# Patient Record
Sex: MALE | Race: WHITE | NOT HISPANIC OR LATINO
[De-identification: names, ages, dates, MRNs, and addresses within clinical notes are randomized per-mention and may not be internally consistent; named-entity substitution may affect disease eponyms.]

---

## 2019-06-26 PROBLEM — Z00.00 ENCOUNTER FOR PREVENTIVE HEALTH EXAMINATION: Status: ACTIVE | Noted: 2019-06-26

## 2019-07-05 ENCOUNTER — APPOINTMENT (OUTPATIENT)
Dept: CARDIOLOGY | Facility: CLINIC | Age: 59
End: 2019-07-05

## 2019-07-26 ENCOUNTER — APPOINTMENT (OUTPATIENT)
Dept: CARDIOLOGY | Facility: CLINIC | Age: 59
End: 2019-07-26
Payer: COMMERCIAL

## 2019-07-26 ENCOUNTER — NON-APPOINTMENT (OUTPATIENT)
Age: 59
End: 2019-07-26

## 2019-07-26 VITALS
HEART RATE: 90 BPM | OXYGEN SATURATION: 97 % | BODY MASS INDEX: 25.06 KG/M2 | DIASTOLIC BLOOD PRESSURE: 72 MMHG | SYSTOLIC BLOOD PRESSURE: 122 MMHG | HEIGHT: 72 IN | WEIGHT: 185 LBS

## 2019-07-26 DIAGNOSIS — Z86.39 PERSONAL HISTORY OF OTHER ENDOCRINE, NUTRITIONAL AND METABOLIC DISEASE: ICD-10-CM

## 2019-07-26 DIAGNOSIS — Z78.9 OTHER SPECIFIED HEALTH STATUS: ICD-10-CM

## 2019-07-26 DIAGNOSIS — Z82.49 FAMILY HISTORY OF ISCHEMIC HEART DISEASE AND OTHER DISEASES OF THE CIRCULATORY SYSTEM: ICD-10-CM

## 2019-07-26 DIAGNOSIS — Z86.79 PERSONAL HISTORY OF OTHER DISEASES OF THE CIRCULATORY SYSTEM: ICD-10-CM

## 2019-07-26 PROCEDURE — 93000 ELECTROCARDIOGRAM COMPLETE: CPT

## 2019-07-26 PROCEDURE — 99204 OFFICE O/P NEW MOD 45 MIN: CPT | Mod: 25

## 2019-07-26 RX ORDER — ASPIRIN 81 MG
81 TABLET, DELAYED RELEASE (ENTERIC COATED) ORAL
Refills: 0 | Status: ACTIVE | COMMUNITY

## 2019-07-26 RX ORDER — ATORVASTATIN CALCIUM 80 MG/1
80 TABLET, FILM COATED ORAL DAILY
Refills: 0 | Status: ACTIVE | COMMUNITY

## 2019-07-26 RX ORDER — LOSARTAN POTASSIUM 25 MG/1
25 TABLET, FILM COATED ORAL DAILY
Refills: 0 | Status: ACTIVE | COMMUNITY

## 2019-07-26 RX ORDER — CLOPIDOGREL BISULFATE 75 MG/1
75 TABLET, FILM COATED ORAL DAILY
Refills: 0 | Status: ACTIVE | COMMUNITY

## 2019-07-26 NOTE — HISTORY OF PRESENT ILLNESS
[FreeTextEntry1] : This is a 59 year old male with history of HTN, HLD (admittedly non-compliant with medications prior his CVA) presents after suffering a CVA. Patient presented to [facility] 6/30/2019, with symptoms of LUE and LLE numbness and tingling. He went to the beach that day and noticed the symptoms then. He also reported temperature sensation decrease. He has had no chest pain, SOB, or palpitations. He underwent MRI of the brain which demonstrated  changes in the right medulla consistent with right medulla stroke. The MRA of the head and and neck revealed loss of flow signal in the right vertebral artery. He was transferred to [facility] for further evaluation and management. Imaging at the hospital confirmed the findings. He was treated medically with high dose statin and dual antiplatelet therapy. Transthoracic echocardiogram was unremarkable. \par \par Patient admits to being very active. He runs on the beach most days of the week. He denies any exertional chest pain, SOB, or palpitations.

## 2019-07-26 NOTE — REVIEW OF SYSTEMS
[Shortness Of Breath] : no shortness of breath [Dyspnea on exertion] : not dyspnea during exertion [Chest Pain] : no chest pain [Lower Ext Edema] : no extremity edema [Palpitations] : no palpitations [see HPI] : see HPI [Limb Weakness (Paresis)] : no limb weakness [Numbness (Hypesthesia)] : numbness [Tingling (Paresthesia)] : tingling [Easy Bleeding] : no tendency for easy bleeding [Easy Bruising] : no tendency for easy bruising [Negative] : Endocrine

## 2019-07-26 NOTE — DISCUSSION/SUMMARY
[FreeTextEntry1] : 1. History of ischemic CVA: recommend neurology follow up. Reviewed and discussed hospital records with patient. Discussed importance of medication compliance going forward. He should remain on DAPT, high dose statin therapy (goal LDL <70), ARB.\par \par 2. Hypertension: appears controlled. Recommend continuing current medical therapy.\par \par 3. HLD: LDL in hospital 98. Started on atorvastatin 80mg daily. Goal LDL <70. Recommend repeat lipid panel in 3-6 months. \par \par Patient can follow up in 6 months or sooner if needed.

## 2019-07-26 NOTE — PHYSICAL EXAM
[General Appearance - Well Developed] : well developed [Well Groomed] : well groomed [Normal Appearance] : normal appearance [General Appearance - Well Nourished] : well nourished [General Appearance - In No Acute Distress] : no acute distress [Eyelids - No Xanthelasma] : the eyelids demonstrated no xanthelasmas [Normal Conjunctiva] : the conjunctiva exhibited no abnormalities [No Oral Cyanosis] : no oral cyanosis [No Oral Pallor] : no oral pallor [FreeTextEntry1] : No JVD, no carotid artery bruits auscultated bilaterally [Auscultation Breath Sounds / Voice Sounds] : lungs were clear to auscultation bilaterally [Respiration, Rhythm And Depth] : normal respiratory rhythm and effort [Exaggerated Use Of Accessory Muscles For Inspiration] : no accessory muscle use [Gait - Sufficient For Exercise Testing] : the gait was sufficient for exercise testing [Abnormal Walk] : normal gait [Nail Clubbing] : no clubbing of the fingernails [Cyanosis, Localized] : no localized cyanosis [Nail Splinter Hemorrhages] : no splinter hemorrhages of the nails [Petechial Hemorrhages (___cm)] : no petechial hemorrhages [Skin Color & Pigmentation] : normal skin color and pigmentation [Impaired Insight] : insight and judgment were intact [] : no rash [Affect] : the affect was normal [Memory Recent] : recent memory was not impaired

## 2019-08-28 ENCOUNTER — APPOINTMENT (OUTPATIENT)
Dept: CARDIOLOGY | Facility: CLINIC | Age: 59
End: 2019-08-28
Payer: COMMERCIAL

## 2019-08-28 VITALS
HEART RATE: 65 BPM | WEIGHT: 187 LBS | HEIGHT: 72 IN | SYSTOLIC BLOOD PRESSURE: 132 MMHG | BODY MASS INDEX: 25.33 KG/M2 | DIASTOLIC BLOOD PRESSURE: 80 MMHG | OXYGEN SATURATION: 98 %

## 2019-08-28 PROCEDURE — 99214 OFFICE O/P EST MOD 30 MIN: CPT

## 2019-08-28 NOTE — PHYSICAL EXAM
[General Appearance - Well Developed] : well developed [Normal Appearance] : normal appearance [Well Groomed] : well groomed [General Appearance - Well Nourished] : well nourished [General Appearance - In No Acute Distress] : no acute distress [Normal Conjunctiva] : the conjunctiva exhibited no abnormalities [Eyelids - No Xanthelasma] : the eyelids demonstrated no xanthelasmas [No Oral Pallor] : no oral pallor [No Oral Cyanosis] : no oral cyanosis [Respiration, Rhythm And Depth] : normal respiratory rhythm and effort [Exaggerated Use Of Accessory Muscles For Inspiration] : no accessory muscle use [Auscultation Breath Sounds / Voice Sounds] : lungs were clear to auscultation bilaterally [Heart Rate And Rhythm] : heart rate and rhythm were normal [Heart Sounds] : normal S1 and S2 [Murmurs] : no murmurs present [Edema] : no peripheral edema present [Abnormal Walk] : normal gait [Gait - Sufficient For Exercise Testing] : the gait was sufficient for exercise testing [Nail Clubbing] : no clubbing of the fingernails [Cyanosis, Localized] : no localized cyanosis [Petechial Hemorrhages (___cm)] : no petechial hemorrhages [Nail Splinter Hemorrhages] : no splinter hemorrhages of the nails [Skin Color & Pigmentation] : normal skin color and pigmentation [] : no rash [Impaired Insight] : insight and judgment were intact [Affect] : the affect was normal [Memory Recent] : recent memory was not impaired [FreeTextEntry1] : No JVD, no carotid artery bruits auscultated bilaterally

## 2019-08-28 NOTE — REVIEW OF SYSTEMS
[see HPI] : see HPI [Numbness (Hypesthesia)] : numbness [Tingling (Paresthesia)] : tingling [Negative] : Endocrine [Shortness Of Breath] : no shortness of breath [Dyspnea on exertion] : not dyspnea during exertion [Chest Pain] : no chest pain [Lower Ext Edema] : no extremity edema [Palpitations] : no palpitations [Limb Weakness (Paresis)] : no limb weakness [Easy Bleeding] : no tendency for easy bleeding [Easy Bruising] : no tendency for easy bruising

## 2019-08-28 NOTE — DISCUSSION/SUMMARY
[FreeTextEntry1] : 1. History of ischemic CVA: patient saw neurology follow up. Reviewed and discussed hospital records with patient. Discussed importance of medication compliance going forward. He should remain on DAPT, high dose statin therapy (goal LDL <70), ARB. Neurology recommended loop recorder. Patient doesn't want any external monitoring device. I did explain the loop recorder procedure, benefits and risks. Discussed that we would be looking for occult PAF, which would change his medical management if present. He states he wants to hold off for now and return in about 6 weeks to discuss further. \par \par 2. Hypertension: appears controlled. Recommend continuing current medical therapy.\par \par 3. HLD: LDL in hospital 98. Started on atorvastatin 80mg daily. Goal LDL <70. Recommend repeat lipid panel in 3-6 months. \par

## 2019-08-28 NOTE — HISTORY OF PRESENT ILLNESS
[FreeTextEntry1] : This is a 59 year old male with history of HTN, HLD (admittedly non-compliant with medications prior his CVA) presents after suffering a CVA. Patient presented to [facility] 6/30/2019, with symptoms of LUE and LLE numbness and tingling. He went to the beach that day and noticed the symptoms then. He also reported temperature sensation decrease. He has had no chest pain, SOB, or palpitations. He underwent MRI of the brain which demonstrated  changes in the right medulla consistent with right medulla stroke. The MRA of the head and and neck revealed loss of flow signal in the right vertebral artery. He was transferred to [facility] for further evaluation and management. Imaging at the hospital confirmed the findings. He was treated medically with high dose statin and dual antiplatelet therapy. Transthoracic echocardiogram was unremarkable. \par \par Patient admits to being very active. He runs on the beach most days of the week. He denies any exertional chest pain, SOB, or palpitations.

## 2019-10-18 ENCOUNTER — APPOINTMENT (OUTPATIENT)
Dept: CARDIOLOGY | Facility: CLINIC | Age: 59
End: 2019-10-18
Payer: COMMERCIAL

## 2019-10-18 VITALS
HEIGHT: 72 IN | HEART RATE: 64 BPM | DIASTOLIC BLOOD PRESSURE: 66 MMHG | BODY MASS INDEX: 26.14 KG/M2 | SYSTOLIC BLOOD PRESSURE: 134 MMHG | WEIGHT: 193 LBS | OXYGEN SATURATION: 95 %

## 2019-10-18 PROCEDURE — 99214 OFFICE O/P EST MOD 30 MIN: CPT

## 2019-10-18 NOTE — PHYSICAL EXAM
[General Appearance - Well Developed] : well developed [Normal Appearance] : normal appearance [Well Groomed] : well groomed [Normal Conjunctiva] : the conjunctiva exhibited no abnormalities [General Appearance - In No Acute Distress] : no acute distress [General Appearance - Well Nourished] : well nourished [Eyelids - No Xanthelasma] : the eyelids demonstrated no xanthelasmas [No Oral Pallor] : no oral pallor [FreeTextEntry1] : No JVD, no carotid artery bruits auscultated bilaterally [No Oral Cyanosis] : no oral cyanosis [Respiration, Rhythm And Depth] : normal respiratory rhythm and effort [Heart Rate And Rhythm] : heart rate and rhythm were normal [Exaggerated Use Of Accessory Muscles For Inspiration] : no accessory muscle use [Auscultation Breath Sounds / Voice Sounds] : lungs were clear to auscultation bilaterally [Murmurs] : no murmurs present [Heart Sounds] : normal S1 and S2 [Edema] : no peripheral edema present [Abnormal Walk] : normal gait [Gait - Sufficient For Exercise Testing] : the gait was sufficient for exercise testing [Nail Clubbing] : no clubbing of the fingernails [Cyanosis, Localized] : no localized cyanosis [Nail Splinter Hemorrhages] : no splinter hemorrhages of the nails [Petechial Hemorrhages (___cm)] : no petechial hemorrhages [Skin Color & Pigmentation] : normal skin color and pigmentation [] : no rash [Impaired Insight] : insight and judgment were intact [Memory Recent] : recent memory was not impaired [Affect] : the affect was normal

## 2019-10-18 NOTE — DISCUSSION/SUMMARY
[FreeTextEntry1] : 1. History of ischemic CVA: patient saw neurology in follow up. Reviewed and discussed hospital records with patient. Discussed importance of medication compliance going forward. He should remain on DAPT, high dose statin therapy (goal LDL <70), ARB. Neurology recommended loop recorder. Patient does not want ILR now. He is willing to do 14 Day Zio Patch. I did explain the loop recorder procedure, benefits and risks. Discussed that we would be looking for occult PAF, which would change his medical management if present. He states he wants to hold off for now.\par \par 2. Hypertension: appears controlled. Recommend continuing current medical therapy.\par \par 3. HLD: LDL in hospital 98. Started on atorvastatin 80mg daily. Goal LDL <70. Recommend repeat lipid panel in 3-6 months. \par \par Follow up in 6 months.\par

## 2019-10-18 NOTE — HISTORY OF PRESENT ILLNESS
[FreeTextEntry1] : This is a 59 year old male with history of HTN, HLD (admittedly non-compliant with medications prior his CVA) presents after suffering a CVA. Patient presented to [facility] 6/30/2019, with symptoms of LUE and LLE numbness and tingling. He went to the beach that day and noticed the symptoms then. He also reported temperature sensation decrease. He has had no chest pain, SOB, or palpitations. He underwent MRI of the brain which demonstrated  changes in the right medulla consistent with right medulla stroke. The MRA of the head and and neck revealed loss of flow signal in the right vertebral artery. He was transferred to [facility] for further evaluation and management. Imaging at the hospital confirmed the findings. He was treated medically with high dose statin and dual antiplatelet therapy. Transthoracic echocardiogram was unremarkable. \par \par Patient admits to being very active. He runs on the beach most days of the week. He denies any exertional chest pain, SOB, or palpitations.

## 2019-11-18 ENCOUNTER — APPOINTMENT (OUTPATIENT)
Dept: CARDIOLOGY | Facility: CLINIC | Age: 59
End: 2019-11-18
Payer: COMMERCIAL

## 2019-11-18 PROCEDURE — 0296T: CPT

## 2019-12-09 PROCEDURE — 0298T: CPT

## 2020-04-13 ENCOUNTER — APPOINTMENT (OUTPATIENT)
Dept: CARDIOLOGY | Facility: CLINIC | Age: 60
End: 2020-04-13

## 2021-02-01 ENCOUNTER — APPOINTMENT (OUTPATIENT)
Dept: UROLOGY | Facility: CLINIC | Age: 61
End: 2021-02-01

## 2021-03-01 ENCOUNTER — APPOINTMENT (OUTPATIENT)
Dept: CARDIOLOGY | Facility: CLINIC | Age: 61
End: 2021-03-01
Payer: COMMERCIAL

## 2021-03-01 ENCOUNTER — NON-APPOINTMENT (OUTPATIENT)
Age: 61
End: 2021-03-01

## 2021-03-01 VITALS
WEIGHT: 198 LBS | OXYGEN SATURATION: 97 % | HEIGHT: 72 IN | HEART RATE: 73 BPM | SYSTOLIC BLOOD PRESSURE: 132 MMHG | BODY MASS INDEX: 26.82 KG/M2 | DIASTOLIC BLOOD PRESSURE: 72 MMHG

## 2021-03-01 DIAGNOSIS — Z01.818 ENCOUNTER FOR OTHER PREPROCEDURAL EXAMINATION: ICD-10-CM

## 2021-03-01 DIAGNOSIS — Z86.73 PERSONAL HISTORY OF TRANSIENT ISCHEMIC ATTACK (TIA), AND CEREBRAL INFARCTION W/OUT RESIDUAL DEFICITS: ICD-10-CM

## 2021-03-01 PROCEDURE — 99214 OFFICE O/P EST MOD 30 MIN: CPT

## 2021-03-01 PROCEDURE — 93000 ELECTROCARDIOGRAM COMPLETE: CPT | Mod: NC

## 2021-03-01 PROCEDURE — 99072 ADDL SUPL MATRL&STAF TM PHE: CPT

## 2021-03-01 RX ORDER — AMLODIPINE BESYLATE 5 MG/1
5 TABLET ORAL DAILY
Refills: 0 | Status: DISCONTINUED | COMMUNITY
End: 2021-03-01

## 2021-03-01 RX ORDER — FINASTERIDE 5 MG/1
5 TABLET, FILM COATED ORAL DAILY
Refills: 0 | Status: DISCONTINUED | COMMUNITY
End: 2021-03-01

## 2021-03-01 NOTE — DISCUSSION/SUMMARY
[FreeTextEntry1] : ABIDA QUINONES  is a 60 year M  who presents today Mar 01, 2021 with the above history and the following active issues. \par \par 1. History of ischemic CVA. No recurrent events. Followed by neurology. On ASA and Plavix with no complaints of bleeding. \par \par 2. Hypertension: appears controlled. Recommend continuing current medical therapy. Low sodium diet\par \par 3. HLD: Continue Atorvastatin. Low cholesterol diet. \par Lifestyle and risk factor modification.\par \par 4. Preoperative status for colonoscopy\par At present, there are no active cardiac conditions. \par No recent unstable coronary syndromes, decompensated heart failure, severe valvular heart disease or significant dysrhythmias.  \par Baseline functional status is good with no new exertional complaints\par The clinical benefit of the proposed procedure outweighs the associated cardiovascular risk.  \par Risk not attenuated with further CV testing.  \par Prior testing as outlined above.\par Optimized from a cardiovascular perspective.\par Hold Plavix for 5 days pre-op and continue ASA. \par Continue beta blocker\par \par Red flag symptoms which would warrant sooner emergent evaluation reviewed with the patient. \par Questions and concerns were addressed and answered. \par \par Sincerely,\par \par Mini Mcguire PA-C\par Patients history, testing and plan reviewed with supervising MD: Dr. Recinos\par

## 2021-03-01 NOTE — HISTORY OF PRESENT ILLNESS
[FreeTextEntry1] : ABIDA QUINONES  is a 60 year M  who presents today Mar 01, 2021 for preoperative clearance for elective routine colonoscopy. Since last seen there has been no recent illness or hospital stay. Asymptomatic from a cardiovascular and arrhythmia standpoint. Exercising with running apporox 10 miles with no exertional complaints. Compliant with all medications. \par On ASA and Plavix with no complaints of bleeding.\par \par Today he denies chest pain, pressure, unusual shortness of breath, lightheadedness, dizziness, near syncope or syncope. \par \par \par History of HTN, HLD (admittedly non-compliant with medications prior his CVA) suffering a CVA in 2019\par \par TESTING:\par \par EKG 3/1/2021 SR\par \par Echo 2019 EF 70%\par \par KALLI 12/9/2019 SR \par \par

## 2021-06-01 ENCOUNTER — NON-APPOINTMENT (OUTPATIENT)
Age: 61
End: 2021-06-01

## 2021-08-10 ENCOUNTER — RX RENEWAL (OUTPATIENT)
Age: 61
End: 2021-08-10

## 2022-03-07 ENCOUNTER — APPOINTMENT (OUTPATIENT)
Dept: CARDIOLOGY | Facility: CLINIC | Age: 62
End: 2022-03-07

## 2022-03-14 ENCOUNTER — NON-APPOINTMENT (OUTPATIENT)
Age: 62
End: 2022-03-14

## 2022-03-14 ENCOUNTER — APPOINTMENT (OUTPATIENT)
Dept: CARDIOLOGY | Facility: CLINIC | Age: 62
End: 2022-03-14
Payer: COMMERCIAL

## 2022-03-14 VITALS
BODY MASS INDEX: 26.95 KG/M2 | SYSTOLIC BLOOD PRESSURE: 142 MMHG | HEART RATE: 54 BPM | HEIGHT: 72 IN | TEMPERATURE: 97.5 F | DIASTOLIC BLOOD PRESSURE: 84 MMHG | OXYGEN SATURATION: 96 % | WEIGHT: 199 LBS

## 2022-03-14 DIAGNOSIS — E78.41 ELEVATED LIPOPROTEIN(A): ICD-10-CM

## 2022-03-14 PROCEDURE — 93000 ELECTROCARDIOGRAM COMPLETE: CPT

## 2022-03-14 PROCEDURE — 99214 OFFICE O/P EST MOD 30 MIN: CPT

## 2022-03-14 NOTE — HISTORY OF PRESENT ILLNESS
[FreeTextEntry1] : ABIDA QUINONES  is a 61 year M  who presents today Mar 14, 2022. Overall he has been feeling well.  Since last seen there has been no recent illness or hospital stay. Asymptomatic from a cardiovascular and arrhythmia standpoint. He has noticed increasing dyspnea with exertion. There is no associated chest pain or pressure. \par Attempts to remain active with working at [employer]. With running he notices increasing REY and that his endurance is less than what it used to be. \par \par Compliant with all medications. \par On ASA and Plavix with no complaints of bleeding.\par \par Today he denies chest pain, pressure, unusual shortness of breath, lightheadedness, dizziness, near syncope or syncope. \par \par \par History of HTN, HLD (admittedly non-compliant with medications prior his CVA) suffering a CVA in 2019\par \par TESTING:\par \par EKG 3/14/2022 sinus bradycardia\par \par EKG 3/1/2021 SR\par \par Echo 2019 EF 70%\par \par KALLI 12/9/2019 SR \par \par

## 2022-03-14 NOTE — PHYSICAL EXAM
[General Appearance - Well Developed] : well developed [Normal Appearance] : normal appearance [Well Groomed] : well groomed [General Appearance - Well Nourished] : well nourished [Normal Conjunctiva] : the conjunctiva exhibited no abnormalities [General Appearance - In No Acute Distress] : no acute distress [Eyelids - No Xanthelasma] : the eyelids demonstrated no xanthelasmas [No Oral Pallor] : no oral pallor [No Oral Cyanosis] : no oral cyanosis [Respiration, Rhythm And Depth] : normal respiratory rhythm and effort [Exaggerated Use Of Accessory Muscles For Inspiration] : no accessory muscle use [Auscultation Breath Sounds / Voice Sounds] : lungs were clear to auscultation bilaterally [Heart Rate And Rhythm] : heart rate and rhythm were normal [Heart Sounds] : normal S1 and S2 [Murmurs] : no murmurs present [Edema] : no peripheral edema present [Abnormal Walk] : normal gait [Gait - Sufficient For Exercise Testing] : the gait was sufficient for exercise testing [Nail Clubbing] : no clubbing of the fingernails [Cyanosis, Localized] : no localized cyanosis [Petechial Hemorrhages (___cm)] : no petechial hemorrhages [Nail Splinter Hemorrhages] : no splinter hemorrhages of the nails [Skin Color & Pigmentation] : normal skin color and pigmentation [] : no rash [Impaired Insight] : insight and judgment were intact [Memory Recent] : recent memory was not impaired [Affect] : the affect was normal [FreeTextEntry1] : No JVD, no carotid artery bruits auscultated bilaterally

## 2022-03-14 NOTE — ADDENDUM
[FreeTextEntry1] : Please note the patient was reviewed with KYLEE Mcguire.\par I was physically present during the service of the patient\par I was directly involved in the management plan and recommendations of care provided to the patient. \par I personally reviewed the history and physical exam and examination as documented by the PA above.\par 03/14/2022\par

## 2022-03-14 NOTE — DISCUSSION/SUMMARY
[FreeTextEntry1] : ABIDA QUINONES  is a 61 year M  who presents today Mar 14, 2022 with the above history and the following active issues. \par \par 1. History of ischemic CVA. No recurrent events. Followed by neurology. On ASA and Plavix with no complaints of bleeding. \par \par 2. Hypertension: Appears well controlled on my assessment 122/76. Recommend continuing current medical therapy. Low sodium diet. \par \par 3. HLD: Continue Atorvastatin. Low cholesterol diet. \par Lifestyle and risk factor modification.\par Fasting lipid panel from PCP has been requested. \par \par 4. Dyspnea on exertion. Risk factors for CAD including family history of CAD, HLD, HTN. Recommend echocardiogram for assessment of resting heart structure and function. Exercise tolerance test. Consider CT calcium score. \par Limitations of non-invasive testing reviewed. \par \par Red flag symptoms which would warrant sooner emergent evaluation reviewed with the patient. \par Questions and concerns were addressed and answered. \par \par Sincerely,\par \par Mini Mcguire PA-C\par Patients history, testing and plan reviewed with supervising MD: Dr. Amrit Agrawal

## 2022-03-24 ENCOUNTER — APPOINTMENT (OUTPATIENT)
Dept: CARDIOLOGY | Facility: CLINIC | Age: 62
End: 2022-03-24
Payer: COMMERCIAL

## 2022-03-24 PROCEDURE — 93306 TTE W/DOPPLER COMPLETE: CPT

## 2022-04-13 ENCOUNTER — NON-APPOINTMENT (OUTPATIENT)
Age: 62
End: 2022-04-13

## 2022-04-13 ENCOUNTER — APPOINTMENT (OUTPATIENT)
Dept: CARDIOLOGY | Facility: CLINIC | Age: 62
End: 2022-04-13
Payer: COMMERCIAL

## 2022-04-13 DIAGNOSIS — R06.00 DYSPNEA, UNSPECIFIED: ICD-10-CM

## 2022-04-13 DIAGNOSIS — I10 ESSENTIAL (PRIMARY) HYPERTENSION: ICD-10-CM

## 2022-04-13 DIAGNOSIS — E78.00 PURE HYPERCHOLESTEROLEMIA, UNSPECIFIED: ICD-10-CM

## 2022-04-13 PROCEDURE — 93015 CV STRESS TEST SUPVJ I&R: CPT

## 2022-05-26 ENCOUNTER — NON-APPOINTMENT (OUTPATIENT)
Age: 62
End: 2022-05-26

## 2023-02-01 RX ORDER — EZETIMIBE 10 MG/1
10 TABLET ORAL
Qty: 30 | Refills: 0 | Status: ACTIVE | COMMUNITY
Start: 2020-09-02 | End: 1900-01-01

## 2023-10-04 ENCOUNTER — APPOINTMENT (OUTPATIENT)
Dept: CARDIOLOGY | Facility: CLINIC | Age: 63
End: 2023-10-04

## 2024-09-11 ENCOUNTER — APPOINTMENT (OUTPATIENT)
Dept: CARDIOLOGY | Facility: CLINIC | Age: 64
End: 2024-09-11
Payer: COMMERCIAL

## 2024-09-11 VITALS
WEIGHT: 194 LBS | OXYGEN SATURATION: 99 % | BODY MASS INDEX: 26.28 KG/M2 | HEART RATE: 58 BPM | SYSTOLIC BLOOD PRESSURE: 124 MMHG | HEIGHT: 72 IN | DIASTOLIC BLOOD PRESSURE: 72 MMHG

## 2024-09-11 DIAGNOSIS — E78.41 ELEVATED LIPOPROTEIN(A): ICD-10-CM

## 2024-09-11 PROCEDURE — 99204 OFFICE O/P NEW MOD 45 MIN: CPT

## 2024-09-11 PROCEDURE — 93000 ELECTROCARDIOGRAM COMPLETE: CPT

## 2024-09-11 RX ORDER — AMLODIPINE BESYLATE 10 MG/1
10 TABLET ORAL DAILY
Refills: 0 | Status: ACTIVE | COMMUNITY

## 2024-09-11 NOTE — HISTORY OF PRESENT ILLNESS
[FreeTextEntry1] : ABIDA QUINONES  is a 64 year M  who presents today Sep 11, 2024 in clinical follow-up.  Overall he has been feeling well.  Since last seen there has been no recent illness or hospital stay. Asymptomatic from a cardiovascular and arrhythmia standpoint. He has noticed increasing dyspnea with exertion. There is no associated chest pain or pressure.  Attempts to remain active with no new exertional complaints.   Compliant with all medications.  On ASA and Plavix with no complaints of bleeding.  Today he denies chest pain, pressure, unusual shortness of breath, lightheadedness, dizziness, near syncope or syncope.   History of HTN, HLD (admittedly non-compliant with medications prior his CVA) suffering a CVA in 2019

## 2024-09-11 NOTE — DISCUSSION/SUMMARY
[FreeTextEntry1] : ABIDA QUINONES  is a 64 year M  who presents today Sep 11, 2024 with the above history and the following active issues.   1. History of ischemic CVA. No recurrent events. Followed by neurology. On ASA and Plavix with no complaints of bleeding.   2. Hypertension: Appears well controlled on my assessment 124/70.  Recommend continuing current medical therapy. Low sodium diet.   3. HLD: Continue Atorvastatin. Low cholesterol diet.  Lifestyle and risk factor modification. Fasting lipid panel from PCP has been requested.   Risk factors for CAD including HTN, HLD, family history of CAD and CVA. Recommend CT calcium score. He will call the office once testing is complete to review results over the phone.   Red flag symptoms which would warrant sooner emergent evaluation reviewed with the patient.  Questions and concerns were addressed and answered.   Sincerely,  Mini Mcguire PA-C Patients history, testing and plan reviewed with supervising MD: Dr. Patrick Valentino

## 2024-09-11 NOTE — CARDIOLOGY SUMMARY
[Normal] : normal [___] : [unfilled] [LVEF ___%] : LVEF [unfilled]% [de-identified] : EKG 3/14/2022 sinus bradycardia  [de-identified] : [facility] [place]  [de-identified] : ETT 4/13/2022 Raymon protocol 9 min  with no evidence of exercise induced ischemia  [de-identified] : Echo 2019 EF 70% Echo 3/24/2022 EF 65%, mild MR

## 2024-09-17 ENCOUNTER — APPOINTMENT (OUTPATIENT)
Dept: CARDIOLOGY | Facility: CLINIC | Age: 64
End: 2024-09-17
Payer: COMMERCIAL

## 2024-09-17 ENCOUNTER — NON-APPOINTMENT (OUTPATIENT)
Age: 64
End: 2024-09-17

## 2024-09-17 VITALS
DIASTOLIC BLOOD PRESSURE: 68 MMHG | HEART RATE: 87 BPM | SYSTOLIC BLOOD PRESSURE: 130 MMHG | BODY MASS INDEX: 26.28 KG/M2 | HEIGHT: 72 IN | OXYGEN SATURATION: 96 % | WEIGHT: 194 LBS

## 2024-09-17 DIAGNOSIS — I25.10 ATHEROSCLEROTIC HEART DISEASE OF NATIVE CORONARY ARTERY W/OUT ANGINA PECTORIS: ICD-10-CM

## 2024-09-17 DIAGNOSIS — I10 ESSENTIAL (PRIMARY) HYPERTENSION: ICD-10-CM

## 2024-09-17 DIAGNOSIS — E78.00 PURE HYPERCHOLESTEROLEMIA, UNSPECIFIED: ICD-10-CM

## 2024-09-17 DIAGNOSIS — I63.9 CEREBRAL INFARCTION, UNSPECIFIED: ICD-10-CM

## 2024-09-17 PROCEDURE — 99214 OFFICE O/P EST MOD 30 MIN: CPT

## 2024-09-17 RX ORDER — FINASTERIDE 5 MG/1
5 TABLET, FILM COATED ORAL DAILY
Refills: 0 | Status: ACTIVE | COMMUNITY

## 2024-09-17 NOTE — HISTORY OF PRESENT ILLNESS
[FreeTextEntry1] : ABIDA QUINONES  is a 64 year M  who presents today Sep 17, 2024 in clinical follow-up.  Overall he has been feeling well.  Since last seen there has been no recent illness or hospital stay. Asymptomatic from a cardiovascular and arrhythmia standpoint. He has noticed increasing dyspnea with exertion. There is no associated chest pain or pressure.  Attempts to remain active with no new exertional complaints.  CT calcium score 372, , hypattenuating lesion in hepatic lobe and 4mm pulmonary nodule. This was discussed previously over the phone - see chart note. He ran out of Losartan and is not certain why he is no longer taking Zetia.   Compliant with all medications.  On ASA and Plavix with no complaints of bleeding.  Today he denies chest pain, pressure, unusual shortness of breath, lightheadedness, dizziness, near syncope or syncope.   History of HTN, HLD (admittedly non-compliant with medications prior his CVA) suffering a CVA in 2019

## 2024-09-17 NOTE — PHYSICAL EXAM
[General Appearance - Well Developed] : well developed [Normal Appearance] : normal appearance [Well Groomed] : well groomed [General Appearance - Well Nourished] : well nourished [General Appearance - In No Acute Distress] : no acute distress [Normal Conjunctiva] : the conjunctiva exhibited no abnormalities [Eyelids - No Xanthelasma] : the eyelids demonstrated no xanthelasmas [No Oral Pallor] : no oral pallor [No Oral Cyanosis] : no oral cyanosis [Respiration, Rhythm And Depth] : normal respiratory rhythm and effort [Exaggerated Use Of Accessory Muscles For Inspiration] : no accessory muscle use [Auscultation Breath Sounds / Voice Sounds] : lungs were clear to auscultation bilaterally [Heart Rate And Rhythm] : heart rate and rhythm were normal [Heart Sounds] : normal S1 and S2 [Murmurs] : no murmurs present [Edema] : no peripheral edema present [Abnormal Walk] : normal gait [Gait - Sufficient For Exercise Testing] : the gait was sufficient for exercise testing [Nail Clubbing] : no clubbing of the fingernails [Petechial Hemorrhages (___cm)] : no petechial hemorrhages [Cyanosis, Localized] : no localized cyanosis [Nail Splinter Hemorrhages] : no splinter hemorrhages of the nails [Skin Color & Pigmentation] : normal skin color and pigmentation [] : no rash [Impaired Insight] : insight and judgment were intact [Affect] : the affect was normal [Memory Recent] : recent memory was not impaired [FreeTextEntry1] : No JVD, no carotid artery bruits auscultated bilaterally

## 2024-09-17 NOTE — CARDIOLOGY SUMMARY
[Normal] : normal [___] : [unfilled] [LVEF ___%] : LVEF [unfilled]% [___] : [unfilled] [de-identified] : EKG 3/14/2022 sinus bradycardia  [de-identified] : [facility] [place]9/2019  [de-identified] : ETT 4/13/2022 Raymon protocol 9 min  with no evidence of exercise induced ischemia  [de-identified] : Echo 2019 EF 70% Echo 3/24/2022 EF 65%, mild MR  [de-identified] : CT calcium score 9/11/2024: calcium score 372, , hypattenuating lesion in hepatic lobe and 4mm pulmonary nodule.

## 2024-09-17 NOTE — DISCUSSION/SUMMARY
[FreeTextEntry1] : ABIDA QUINONES  is a 64 year M  who presents today Sep 17, 2024 with the above history and the following active issues.   1. History of ischemic CVA. No recurrent events. Followed by neurology. On ASA and Plavix with no complaints of bleeding.   2. Hypertension: Appears well controlled on my assessment 130/70.  Recommend continuing current medical therapy. Low sodium diet.  Continue Losartan and Norvasc.  3. HLD: Continue Atorvastatin. Low cholesterol diet.  Lifestyle and risk factor modification. Restart Zetia.  Fasting lipid panel in 6-8 weeks.   4. Risk factors for CAD including HTN, HLD, family history of CAD and CVA. CT calcium score 372. Worsening dyspnea on exertion. Recommend follow-up echo for reassessment of resting heart structure and function.  Ischemic evaluation with nuclear stress test.  Continue high dose statin, zetia, ASA and Plavix.  Incidental finding on CT of hepatic lesion and pulmonary nodule. Patient verbalizing understanding further evaluation and follow-up is recommended. He will follow-up with PCP and consider evaluation with pulmonologist/GI.   Red flag symptoms which would warrant sooner emergent evaluation reviewed with the patient.  Questions and concerns were addressed and answered.  Limitations of non-invasive testing reviewed.  Sincerely,  Mini Mcguire PA-C Patients history, testing and plan reviewed with supervising MD: Dr. Tanika Herrera

## 2024-09-27 ENCOUNTER — APPOINTMENT (OUTPATIENT)
Dept: CARDIOLOGY | Facility: CLINIC | Age: 64
End: 2024-09-27

## 2024-09-30 ENCOUNTER — APPOINTMENT (OUTPATIENT)
Dept: CARDIOLOGY | Facility: CLINIC | Age: 64
End: 2024-09-30
Payer: COMMERCIAL

## 2024-09-30 ENCOUNTER — NON-APPOINTMENT (OUTPATIENT)
Age: 64
End: 2024-09-30

## 2024-09-30 PROCEDURE — 93356 MYOCRD STRAIN IMG SPCKL TRCK: CPT

## 2024-09-30 PROCEDURE — 93306 TTE W/DOPPLER COMPLETE: CPT

## 2024-10-01 ENCOUNTER — APPOINTMENT (OUTPATIENT)
Dept: CARDIOLOGY | Facility: CLINIC | Age: 64
End: 2024-10-01
Payer: COMMERCIAL

## 2024-10-01 VITALS
HEIGHT: 72 IN | DIASTOLIC BLOOD PRESSURE: 60 MMHG | HEART RATE: 68 BPM | SYSTOLIC BLOOD PRESSURE: 110 MMHG | WEIGHT: 190 LBS | BODY MASS INDEX: 25.73 KG/M2 | OXYGEN SATURATION: 98 %

## 2024-10-01 DIAGNOSIS — I25.10 ATHEROSCLEROTIC HEART DISEASE OF NATIVE CORONARY ARTERY W/OUT ANGINA PECTORIS: ICD-10-CM

## 2024-10-01 DIAGNOSIS — I10 ESSENTIAL (PRIMARY) HYPERTENSION: ICD-10-CM

## 2024-10-01 DIAGNOSIS — E78.00 PURE HYPERCHOLESTEROLEMIA, UNSPECIFIED: ICD-10-CM

## 2024-10-01 PROCEDURE — 93264 REM MNTR WRLS P-ART PRS SNR: CPT

## 2024-10-01 PROCEDURE — 78452 HT MUSCLE IMAGE SPECT MULT: CPT

## 2024-10-01 PROCEDURE — A9502: CPT | Mod: JZ

## 2024-10-01 PROCEDURE — 93015 CV STRESS TEST SUPVJ I&R: CPT

## 2024-10-01 NOTE — DISCUSSION/SUMMARY
[FreeTextEntry1] : ABIDA QUINONES  is a 64 year M  who presents today Oct 01, 2024 with the above history and the following active issues.   1. History of ischemic CVA. No recurrent events. Followed by neurology. On ASA and Plavix with no complaints of bleeding.   2. Hypertension: Appears well controlled on my assessment 130/70.  Recommend continuing current medical therapy. Low sodium diet.  Continue Losartan and Norvasc.  3. HLD: Continue Atorvastatin. Low cholesterol diet.  Lifestyle and risk factor modification. Continue Zetia.  Fasting lipid panel in 6-8 weeks.   4. Risk factors for CAD including HTN, HLD, family history of CAD and CVA. CT calcium score 372. Worsening dyspnea on exertion. Recommend follow-up echo for reassessment of resting heart structure and function.  Continue high dose statin, zetia, ASA and Plavix. DAPT is per neurology recommendations. I encouraged pt to follow with PCP/Neuro to ensure they want DAPT continued. From our standpoint ASA 81mg QD is acceptable.  Nuclear stress test today demonstrates mild apical ischemia. He is asymptomatic with excellent exercise tolerance. Medical therapy vs invasive approach with [facility] reviewed. Opting for medical therapy. Patient verbalized understanding of high risk symptoms which would warrant urgent evaluation.   Incidental finding on CT of hepatic lesion and pulmonary nodule. Patient verbalizing understanding further evaluation and follow-up is recommended. He will follow-up with PCP and consider evaluation with pulmonologist/GI.   Red flag symptoms which would warrant sooner emergent evaluation reviewed with the patient.  Questions and concerns were addressed and answered.  Limitations of non-invasive testing reviewed.  Sincerely,  Mini Mcguire PA-C Patients history, testing and plan reviewed with supervising MD: Dr. Tanika Herrera

## 2024-10-01 NOTE — HISTORY OF PRESENT ILLNESS
[FreeTextEntry1] : ABIDA QUINONES  is a 64 year M  who presents today Oct 01, 2024 for nuclear stress test and clinical follow-up.  Overall he has been feeling well.  Since last seen there has been no recent illness or hospital stay. Asymptomatic from a cardiovascular and arrhythmia standpoint. He has noticed increasing dyspnea with exertion. There is no associated chest pain or pressure.  Attempts to remain active with no new exertional complaints.  CT calcium score 372, , hypattenuating lesion in hepatic lobe and 4mm pulmonary nodule.  Nuclear stress test with excellent exercise tolerance, no evidence of ischemia by EKG and mild apical ischemia on perfusion imaging.   Compliant with all medications.  On ASA and Plavix per neurology recommendations in 2019 with no complaints of bleeding.  Today he denies chest pain, pressure, unusual shortness of breath, lightheadedness, dizziness, near syncope or syncope.   History of HTN, HLD (admittedly non-compliant with medications prior his CVA) suffering a CVA in 2019

## 2024-10-01 NOTE — PHYSICAL EXAM
[General Appearance - Well Developed] : well developed [Normal Appearance] : normal appearance [Well Groomed] : well groomed [General Appearance - Well Nourished] : well nourished [General Appearance - In No Acute Distress] : no acute distress [] : no respiratory distress [Respiration, Rhythm And Depth] : normal respiratory rhythm and effort [Exaggerated Use Of Accessory Muscles For Inspiration] : no accessory muscle use [Auscultation Breath Sounds / Voice Sounds] : lungs were clear to auscultation bilaterally [Heart Rate And Rhythm] : heart rate and rhythm were normal [Heart Sounds] : normal S1 and S2 [Murmurs] : no murmurs present [Edema] : no peripheral edema present [Abnormal Walk] : normal gait [Gait - Sufficient For Exercise Testing] : the gait was sufficient for exercise testing [Impaired Insight] : insight and judgment were intact [Affect] : the affect was normal [Memory Recent] : recent memory was not impaired [FreeTextEntry1] : No JVD, no carotid artery bruits auscultated bilaterally

## 2024-10-07 ENCOUNTER — APPOINTMENT (OUTPATIENT)
Dept: CARDIOLOGY | Facility: CLINIC | Age: 64
End: 2024-10-07

## 2025-03-31 ENCOUNTER — NON-APPOINTMENT (OUTPATIENT)
Age: 65
End: 2025-03-31

## 2025-04-01 ENCOUNTER — APPOINTMENT (OUTPATIENT)
Dept: CARDIOLOGY | Facility: CLINIC | Age: 65
End: 2025-04-01
Payer: COMMERCIAL

## 2025-04-01 ENCOUNTER — APPOINTMENT (OUTPATIENT)
Dept: CARDIOLOGY | Facility: CLINIC | Age: 65
End: 2025-04-01

## 2025-04-01 VITALS
BODY MASS INDEX: 26.41 KG/M2 | OXYGEN SATURATION: 97 % | SYSTOLIC BLOOD PRESSURE: 120 MMHG | HEIGHT: 72 IN | DIASTOLIC BLOOD PRESSURE: 70 MMHG | HEART RATE: 67 BPM | WEIGHT: 195 LBS

## 2025-04-01 DIAGNOSIS — I10 ESSENTIAL (PRIMARY) HYPERTENSION: ICD-10-CM

## 2025-04-01 DIAGNOSIS — E78.41 ELEVATED LIPOPROTEIN(A): ICD-10-CM

## 2025-04-01 DIAGNOSIS — E78.00 PURE HYPERCHOLESTEROLEMIA, UNSPECIFIED: ICD-10-CM

## 2025-04-01 DIAGNOSIS — I63.9 CEREBRAL INFARCTION, UNSPECIFIED: ICD-10-CM

## 2025-04-01 DIAGNOSIS — I25.10 ATHEROSCLEROTIC HEART DISEASE OF NATIVE CORONARY ARTERY W/OUT ANGINA PECTORIS: ICD-10-CM

## 2025-04-01 PROCEDURE — 99204 OFFICE O/P NEW MOD 45 MIN: CPT
